# Patient Record
Sex: FEMALE | Race: WHITE
[De-identification: names, ages, dates, MRNs, and addresses within clinical notes are randomized per-mention and may not be internally consistent; named-entity substitution may affect disease eponyms.]

---

## 2018-06-12 ENCOUNTER — HOSPITAL ENCOUNTER (EMERGENCY)
Dept: HOSPITAL 25 - UCEAST | Age: 83
Discharge: HOME | End: 2018-06-12
Payer: MEDICARE

## 2018-06-12 VITALS — DIASTOLIC BLOOD PRESSURE: 67 MMHG | SYSTOLIC BLOOD PRESSURE: 167 MMHG

## 2018-06-12 DIAGNOSIS — J45.909: ICD-10-CM

## 2018-06-12 DIAGNOSIS — Z82.49: ICD-10-CM

## 2018-06-12 DIAGNOSIS — M19.90: ICD-10-CM

## 2018-06-12 DIAGNOSIS — Z88.8: ICD-10-CM

## 2018-06-12 DIAGNOSIS — I10: ICD-10-CM

## 2018-06-12 DIAGNOSIS — J06.9: Primary | ICD-10-CM

## 2018-06-12 DIAGNOSIS — Z85.3: ICD-10-CM

## 2018-06-12 PROCEDURE — 99212 OFFICE O/P EST SF 10 MIN: CPT

## 2018-06-12 PROCEDURE — G0463 HOSPITAL OUTPT CLINIC VISIT: HCPCS

## 2018-06-12 NOTE — UC
Respiratory Complaint HPI





- HPI Summary


HPI Summary: 


4 DAYS OF PRODUCTIVE COUGH, NASAL CONGESTION, HOARSENESS, SINUS PRESSURE AND 

FATIGUE. NO FEVER. IS HERE VISITING FROM [place].





- History of Current Complaint


Chief Complaint: UCGeneralIllness


Stated Complaint: SINUS COMPLAINT,COUGH


Time Seen by Provider: 06/12/18 11:34


Hx Obtained From: Patient


Onset/Duration: Gradual Onset, Lasting Days, Still Present


Timing: Constant


Severity Initially: Moderate


Severity Currently: Moderate


Pain Intensity: 0


Pain Scale Used: 0-10 Numeric


Character: Cough: Productive


Associated Signs And Symptoms: Positive: URI, Nasal Congestion, Hoarseness





- Allergies/Home Medications


Allergies/Adverse Reactions: 


 Allergies











Allergy/AdvReac Type Severity Reaction Status Date / Time


 


amlodipine Allergy  See Comment Verified 06/12/18 11:14


 


SSRI &SSNI Allergy Intermediate See Comment Uncoded 11/25/15 19:08











Home Medications: 


 Home Medications





traZODone TAB* [Desyrel TAB*] 75 mg PO BEDTIME 06/12/18 [History Confirmed 06/12 /18]











PMH/Surg Hx/FS Hx/Imm Hx





- Additional Past Medical History


Additional PMH: 





ARTHRITIS


Cardiovascular History: Hypertension


Respiratory History: Asthma


Cancer History: Breast Cancer





- Surgical History


Surgical History: Yes


Surgery Procedure, Year, and Place: Tonsillectomy, Left underarm lymph  node 

dissection, D&C





- Family History


Known Family History: Positive: Hypertension





- Social History


Alcohol Use: Occasionally


Substance Use Type: None


Smoking Status (MU): Never Smoked Tobacco





- Immunization History


Most Recent Tetanus Shot: UNK





Review of Systems


Constitutional: Negative


ENT: Sore Throat, Nasal Discharge


Respiratory: Cough


Cardiovascular: Negative


Gastrointestinal: Negative


All Other Systems Reviewed And Are Negative: Yes





Physical Exam


Triage Information Reviewed: Yes


Appearance: Well-Appearing, No Pain Distress, Well-Nourished


Vital Signs: 


 Initial Vital Signs











Temp  98.2 F   06/12/18 11:31


 


Pulse  68   06/12/18 11:31


 


Resp  18   06/12/18 11:31


 


BP  167/67   06/12/18 11:31


 


Pulse Ox  100   06/12/18 11:31











Vital Signs Reviewed: Yes


Eyes: Positive: Conjunctiva Clear


ENT: Positive: Hearing grossly normal, Pharynx normal, TMs normal, Hoarse voice


Neck: Positive: Supple, Nontender, No Lymphadenopathy


Respiratory Exam: Normal


Cardiovascular Exam: Normal


Abdomen Description: Positive: Soft


Musculoskeletal: Positive: No Edema


Neurological: Positive: Alert


Psychological: Positive: Age Appropriate Behavior


Skin: Negative: rashes





UC Diagnostic Evaluation





- Laboratory


O2 Sat by Pulse Oximetry: 100





Respiratory Course/Dx





- Differential Dx/Diagnosis


Provider Diagnoses: ACUTE URI





Discharge





- Sign-Out/Discharge


Documenting (check all that apply): Discharge/Admit/Transfer





- Discharge Plan


Condition: Stable


Disposition: HOME


Prescriptions: 


Azithromycin 500 mg PO DAILY #5 tab


Patient Education Materials:  Upper Respiratory Infection (ED)


Referrals: 


No Primary Care Phys,NOPCP [Primary Care Provider] - 


Additional Instructions: 


YOUR SYMPTOMS MAY BE VIRALLY MEDIATED BUT GIVEN THE FACT THAT YOU ARE 

TRAVELLING WE WILL COVER YOU WITH ANTIBIOTICS. IF YOU START THE MEDICINE BE 

SURE TO TAKE IT FOR THE FULL COURSE. REST, HYDRATE, OTC MEDS AS NEEDED. SEEK 

FOLLOW-UP WITH YOUR PCP IN [place] IF YOU ARE NOT IMPROVING OVER THE NEXT 1-2 

WEEKS.





I AM HERE THIS FRIDAY MORNING AND BOTH SATURDAY AND SUNDAY EVENING IF YOU HAVE 

ANY QUESTIONS OR CONCERNS.





- Billing Disposition and Condition


Condition: STABLE


Disposition: Home